# Patient Record
Sex: MALE | Race: WHITE | Employment: FULL TIME | URBAN - METROPOLITAN AREA
[De-identification: names, ages, dates, MRNs, and addresses within clinical notes are randomized per-mention and may not be internally consistent; named-entity substitution may affect disease eponyms.]

---

## 2017-12-27 ENCOUNTER — TELEPHONE (OUTPATIENT)
Dept: ORTHOPEDICS | Facility: CLINIC | Age: 42
End: 2017-12-27

## 2018-01-04 ENCOUNTER — OFFICE VISIT (OUTPATIENT)
Dept: ORTHOPEDICS | Facility: CLINIC | Age: 43
End: 2018-01-04
Payer: COMMERCIAL

## 2018-01-04 VITALS
BODY MASS INDEX: 27.68 KG/M2 | SYSTOLIC BLOOD PRESSURE: 131 MMHG | HEIGHT: 63 IN | RESPIRATION RATE: 16 BRPM | DIASTOLIC BLOOD PRESSURE: 79 MMHG | HEART RATE: 65 BPM | WEIGHT: 156.2 LBS

## 2018-01-04 DIAGNOSIS — M67.951 TENDINOPATHY OF RIGHT GLUTEUS MEDIUS: Primary | ICD-10-CM

## 2018-01-04 RX ORDER — TRIAMCINOLONE ACETONIDE 40 MG/ML
40 INJECTION, SUSPENSION INTRA-ARTICULAR; INTRAMUSCULAR ONCE
Qty: 1 ML | Refills: 0 | OUTPATIENT
Start: 2018-01-04 | End: 2018-01-04

## 2018-01-04 NOTE — MR AVS SNAPSHOT
After Visit Summary   1/4/2018    Anibal Martin    MRN: [id number]           Patient Information     Date Of Birth          1975        Visit Information        Provider Department      1/4/2018 9:15 AM Carlitos Keith MD [facility]        Today's Diagnoses     Tendinopathy of right gluteus medius    -  1       Follow-ups after your visit        Additional Services     [facility] PT, HAND, AND CHIROPRACTIC REFERRAL       === This order will print in the [facility] Scheduling  Office ===    Physical therapy, hand therapy and chiropractic care are available through:    [facility]  [facility]    Call one easy number to schedule at any of the above locations:  [phone].    Your provider has referred you to Physical Therapy at [facility] or [facility]    Indication/Reason for Referral: Hip Pain  Onset of Illness:  Rt glut medius tendinitis with walking hills 6 weeks ago  Therapy Orders:  Evaluate and Treat  Special Programs:  None  Special Request:  None    Additional Comments for the therapist or chiropractor:  6-8 vistis    Please be aware that coverage of these services is subject to the terms and limitations of your health insurance plan.  Call member services at your health plan with any benefit or coverage questions.      Please bring the following to your appointment:    >>   Your personal calendar for scheduling future appointments  >>   Comfortable clothing                  Who to contact     Please call your clinic at [phone] to:    Ask questions about your health    Make or cancel appointments    Discuss your medicines    Learn about your test results    Speak to your doctor   If you have compliments or concerns about an experience at your clinic, or if you wish to file a complaint, please contact [facility] Patient Relations at [phone] or email us at [email or web address]          "Additional Information About Your Visit        [employer] Information     [employer] gives you secure access to your electronic health record. If you see a primary care provider, you can also send messages to your care team and make appointments. If you have questions, please call your primary care clinic.  If you do not have a primary care provider, please call [phone] and they will assist you.      [employer] is an electronic gateway that provides easy, online access to your medical records. With [employer], you can request a clinic appointment, read your test results, renew a prescription or communicate with your care team.     To access your existing account, please contact your [facility] or call [phone] for assistance.        Care EveryWhere ID     This is your Care EveryWhere ID. This could be used by other organizations to access your [facility] medical records  [id number]        Your Vitals Were     Pulse Respirations Height BMI (Body Mass Index)          65 16 5' 3.25\" (1.607 m) 27.45 kg/m2         Blood Pressure from Last 3 Encounters:   01/04/18 131/79   12/09/08 132/64   12/01/08 110/70    Weight from Last 3 Encounters:   01/04/18 156 lb 3.2 oz (70.9 kg)              We Performed the Following     [facility] PT, HAND, AND CHIROPRACTIC REFERRAL     Large Joint/Bursa injection and/or drainage - Unilateral (Shoulder, Knee) [[id number]]     TRIAMCINOLONE ACET INJ NOS          Today's Medication Changes          These changes are accurate as of: 1/4/18 10:04 AM.  If you have any questions, ask your nurse or doctor.               Start taking these medicines.        Dose/Directions    triamcinolone acetonide 40 MG/ML injection   Commonly known as:  KENALOG   Used for:  Tendinopathy of right gluteus medius   Started by:  Carlitos Keith MD        Dose:  40 mg   1 mL (40 mg) by INTRA-ARTICULAR route once for 1 dose   Quantity:  1 mL   Refills:  0            Where to get your medicines    "   Some of these will need a paper prescription and others can be bought over the counter.  Ask your nurse if you have questions.     You don't need a prescription for these medications     triamcinolone acetonide 40 MG/ML injection                Primary Care Provider    None Specified       No primary provider on file.        Equal Access to Services     CARLEY BURKS : Hadii aad ku hadsaracecy Rosauraerick, wacoreyda luqadaha, qaybta kaalmada glenroyjencaleb, heena dubosedidiromeo sweeney. So [facility] [phone].    ATENCIÓN: Si habla español, tiene a sutton disposición servicios gratuitos de asistencia lingüística. Llame al [phone].    We comply with applicable federal civil rights laws and [place] laws. We do not discriminate on the basis of race, color, national origin, age, disability, sex, sexual orientation, or gender identity.            Thank you!     Thank you for choosing [facility]  for your care. Our goal is always to provide you with excellent care. Hearing back from our patients is one way we can continue to improve our services. Please take a few minutes to complete the written survey that you may receive in the mail after your visit with us. Thank you!             Your Updated Medication List - Protect others around you: Learn how to safely use, store and throw away your medicines at www.disposemymeds.org.          This list is accurate as of: 1/4/18 10:04 AM.  Always use your most recent med list.                   Brand Name Dispense Instructions for use Diagnosis    ibuprofen 200 MG tablet   Generic drug:  ibuprofen      1 TABLET EVERY 4 TO 6 HOURS AS NEEDED        triamcinolone acetonide 40 MG/ML injection    KENALOG    1 mL    1 mL (40 mg) by INTRA-ARTICULAR route once for 1 dose    Tendinopathy of right gluteus medius

## 2018-01-04 NOTE — PROGRESS NOTES
"Sports Medicine Clinic Visit    PCP: No primary care provider on file.    Anibal Martin is a 42 year old male who is seen  as self referral presenting with right hip pain. [facility]. Pain started several weeks before Thanksgiving. Lateral hip pain.    Injury: None    Location of Pain: right hip  Duration of Pain: 6 weeks  Rating of Pain: Can't walk at worst   Pain is better with: Ice, Rest, prednisone, and oil  Pain is worse with: Walking, anything  Additional Features: None  Treatment so far consists of: DMSO Oil, ice, rest, prednisone  Prior History of related problems: None    /79 (BP Location: Right arm, Patient Position: Sitting, Cuff Size: Adult Regular)  Pulse 65  Resp 16  Ht 5' 3.25\" (1.607 m)  Wt 156 lb 3.2 oz (70.9 kg)  BMI 27.45 kg/m2         PMH:  Lateral epicondylitis    Active problem list:  There is no problem list on file for this patient.      FH:  No family history on file. neg inherited bone or jt dsr    SH:  Social History     Social History     Marital status:      Spouse name: N/A     Number of children: N/A     Years of education: N/A     Occupational History     Not on file.     Social History Main Topics     Smoking status: Former Smoker     Smokeless tobacco: Not on file     Alcohol use Not on file     Drug use: Not on file     Sexual activity: Not on file     Other Topics Concern     Not on file     Social History Narrative       MEDS:  See EMR, reviewed  ALL:  See EMR, reviewed    REVIEW OF SYSTEMS:  CONSTITUTIONAL:NEGATIVE for fever, chills, change in weight  INTEGUMENTARY/SKIN: NEGATIVE for worrisome rashes, moles or lesions  EYES: NEGATIVE for vision changes or irritation  ENT/MOUTH: NEGATIVE for ear, mouth and throat problems  RESP:NEGATIVE for significant cough or SOB  BREAST: NEGATIVE for masses, tenderness or discharge  CV: NEGATIVE for chest pain, palpitations or peripheral edema  GI: NEGATIVE for nausea, abdominal pain, " heartburn, or change in bowel habits  :NEGATIVE for frequency, dysuria, or hematuria  :NEGATIVE for frequency, dysuria, or hematuria  NEURO: NEGATIVE for weakness, dizziness or paresthesias  ENDOCRINE: NEGATIVE for temperature intolerance, skin/hair changes  HEME/ALLERGY/IMMUNE: NEGATIVE for bleeding problems  PSYCHIATRIC: NEGATIVE for changes in mood or affect     subjective: This 42-year-old male is to the lateral side of his right hip is an area of focal discomfort that bothersome with walking, going up and down stairs. It seemed to start with his walking program that he does for exercise 6 weeks ago. He was walking a lot of hills. In urgent care. X-ray taken of his hip. He shows me the pictures on his phone and he has normal hip joints bilaterally without signs of degenerative change.    Objective: He points to the lateral trochanter of his right hip as the area of discomfort. He has normal ROM to the right hip to internal rotation, external rotation, abduction. Nontender over the anterior superior iliac spine. Consistently tender over the greater trochanter of the gluteus medius attachment. Normal range of motion at the knee. He has a tendency towards a pes cavus foot. Distal pulses and sensation are intact. Assessment gluteus medius tendinitis of the right hip       Plan: Conservative cares discussed including localized massage with a tennis ball against a wall, physical therapy protocols for strengthening alignments, cortisone injection for pain relief, blood injections, dry needling. Out of these options he wanted to pursue physical therapy referral and try a single cortisone injection. After informed consent about bleeding infection and steroid flare and after prepping with surgical scrub used injected the right lateral hip with 1 cc of Kenalog 40 and 4 cc of 1% lidocaine for improvement over the next week and physical therapy protocol was provided.

## 2018-01-04 NOTE — LETTER
"  1/4/2018      RE: Anibal Martin  [address]  [place] [address]       Sports Medicine Clinic Visit    PCP: No primary care provider on file.    Anibal Martin is a 42 year old male who is seen  as self referral presenting with right hip pain. [facility] [place]. Pain started several weeks before [place]. Lateral hip pain.    Injury: None    Location of Pain: right hip  Duration of Pain: 6 weeks  Rating of Pain: Can't walk at worst   Pain is better with: Ice, Rest, prednisone, and oil  Pain is worse with: Walking, anything  Additional Features: None  Treatment so far consists of: DMSO Oil, ice, rest, prednisone  Prior History of related problems: None    /79 (BP Location: Right arm, Patient Position: Sitting, Cuff Size: Adult Regular)  Pulse 65  Resp 16  Ht 5' 3.25\" (1.607 m)  Wt 156 lb 3.2 oz (70.9 kg)  BMI 27.45 kg/m2         PMH:  Lateral epicondylitis    Active problem list:  There is no problem list on file for this patient.      FH:  No family history on file. neg inherited bone or jt dsr    SH:  Social History     Social History     Marital status:      Spouse name: N/A     Number of children: N/A     Years of education: N/A     Occupational History     Not on file.     Social History Main Topics     Smoking status: Former Smoker     Smokeless tobacco: Not on file     Alcohol use Not on file     Drug use: Not on file     Sexual activity: Not on file     Other Topics Concern     Not on file     Social History Narrative       MEDS:  See EMR, reviewed  ALL:  See EMR, reviewed    REVIEW OF SYSTEMS:  CONSTITUTIONAL:NEGATIVE for fever, chills, change in weight  INTEGUMENTARY/SKIN: NEGATIVE for worrisome rashes, moles or lesions  EYES: NEGATIVE for vision changes or irritation  ENT/MOUTH: NEGATIVE for ear, mouth and throat problems  RESP:NEGATIVE for significant cough or SOB  BREAST: NEGATIVE for masses, tenderness or discharge  CV: NEGATIVE for chest " pain, palpitations or peripheral edema  GI: NEGATIVE for nausea, abdominal pain, heartburn, or change in bowel habits  :NEGATIVE for frequency, dysuria, or hematuria  :NEGATIVE for frequency, dysuria, or hematuria  NEURO: NEGATIVE for weakness, dizziness or paresthesias  ENDOCRINE: NEGATIVE for temperature intolerance, skin/hair changes  HEME/ALLERGY/IMMUNE: NEGATIVE for bleeding problems  PSYCHIATRIC: NEGATIVE for changes in mood or affect     subjective: This 42-year-old male is to the lateral side of his right hip is an area of focal discomfort that bothersome with walking, going up and down stairs. It seemed to start with his walking program that he does for exercise 6 weeks ago. He was walking a lot of hills. In urgent care. X-ray taken of his hip. He shows me the pictures on his phone and he has normal hip joints bilaterally without signs of degenerative change.    Objective: He points to the lateral trochanter of his right hip as the area of discomfort. He has normal ROM to the right hip to internal rotation, external rotation, abduction. Nontender over the anterior superior iliac spine. Consistently tender over the greater trochanter of the gluteus medius attachment. Normal range of motion at the knee. He has a tendency towards a pes cavus foot. Distal pulses and sensation are intact. Assessment gluteus medius tendinitis of the right hip       Plan: Conservative cares discussed including localized massage with a tennis ball against a wall, physical therapy protocols for strengthening alignments, cortisone injection for pain relief, blood injections, dry needling. Out of these options he wanted to pursue physical therapy referral and try a single cortisone injection. After informed consent about bleeding infection and steroid flare and after prepping with surgical scrub used injected the right lateral hip with 1 cc of Kenalog 40 and 4 cc of 1% lidocaine for improvement over the next week and physical  therapy protocol was provided.                  Carlitos Keith MD

## 2018-01-04 NOTE — NURSING NOTE
[address] [address]  Dept: [phone]  ______________________________________________________________________________    Patient: Anibal Martin   : 1975   MRN: [id number]   2018    INVASIVE PROCEDURE SAFETY CHECKLIST    Date: 2018   Procedure: Right greater trochanter kenalog injection  Patient Name: Anibal Martin  MRN: [id number]  YOB: 1975    Action: Complete sections as appropriate. Any discrepancy results in a HARD COPY until resolved.     PRE PROCEDURE:  Patient ID verified with 2 identifiers (name and  or MRN): Yes  Procedure and site verified with patient/designee (when able): Yes  Accurate consent documentation in medical record: Yes  H&P (or appropriate assessment) documented in medical record: Yes  H&P must be up to 20 days prior to procedure and updates within 24 hours of procedure as applicable: NA  Relevant diagnostic and radiology test results appropriately labeled and displayed as applicable: Yes  Procedure site(s) marked with provider initials: NA    TIMEOUT:  Time-Out performed immediately prior to starting procedure, including verbal and active participation of all team members addressing the following:Yes  * Correct patient identify  * Confirmed that the correct side and site are marked  * An accurate procedure consent form  * Agreement on the procedure to be done  * Correct patient position  * Relevant images and results are properly labeled and appropriately displayed  * The need to administer antibiotics or fluids for irrigation purposes during the procedure as applicable   * Safety precautions based on patient history or medication use    DURING PROCEDURE: Verification of correct person, site, and procedures any time the responsibility for care of the patient is transferred to another member of the care team.     The following medication was given:     MEDICATION:  Kenalog 40  mg  ROUTE: Bursa  SITE: Right great trochanter  DOSE: 40 mg   LOT #: [id number]  : [employer]  EXPIRATION DATE: MAY2019  NDC#: 4035-0874-95   Was there drug waste? No     MEDICATION:  1% Lidocaine without epinephrine  ROUTE: Bursa  SITE: Right greater trochanter  DOSE: 4 mL  LOT #: [id number]  : [employer]  EXPIRATION DATE: 09/21  NDC#: 99256-705-71   Was there drug waste? Yes  Amount of drug waste (mL): 16.  Reason for waste:  Single use vial      Beulah Muro, ATC  January 4, 2018

## 2018-01-04 NOTE — LETTER
Date:January 5, 2018      Patient was self referred, no letter generated. Do not send.        [facility] Health Information

## 2019-11-03 ENCOUNTER — HEALTH MAINTENANCE LETTER (OUTPATIENT)
Age: 44
End: 2019-11-03

## 2020-11-16 ENCOUNTER — HEALTH MAINTENANCE LETTER (OUTPATIENT)
Age: 45
End: 2020-11-16

## 2021-03-10 ENCOUNTER — IMMUNIZATION (OUTPATIENT)
Dept: NURSING | Facility: CLINIC | Age: 46
End: 2021-03-10
Payer: COMMERCIAL

## 2021-03-10 PROCEDURE — 0031A PR COVID VAC JANSSEN AD26 0.5ML: CPT

## 2021-03-10 PROCEDURE — 91303 PR COVID VAC JANSSEN AD26 0.5ML: CPT

## 2021-09-18 ENCOUNTER — HEALTH MAINTENANCE LETTER (OUTPATIENT)
Age: 46
End: 2021-09-18

## 2022-01-08 ENCOUNTER — HEALTH MAINTENANCE LETTER (OUTPATIENT)
Age: 47
End: 2022-01-08

## 2022-11-20 ENCOUNTER — HEALTH MAINTENANCE LETTER (OUTPATIENT)
Age: 47
End: 2022-11-20

## 2023-04-15 ENCOUNTER — HEALTH MAINTENANCE LETTER (OUTPATIENT)
Age: 48
End: 2023-04-15

## (undated) RX ORDER — LIDOCAINE HYDROCHLORIDE 10 MG/ML
INJECTION, SOLUTION INFILTRATION; PERINEURAL
Status: DISPENSED
Start: 2018-01-04

## (undated) RX ORDER — TRIAMCINOLONE ACETONIDE 40 MG/ML
INJECTION, SUSPENSION INTRA-ARTICULAR; INTRAMUSCULAR
Status: DISPENSED
Start: 2018-01-04